# Patient Record
Sex: FEMALE | Race: BLACK OR AFRICAN AMERICAN | URBAN - METROPOLITAN AREA
[De-identification: names, ages, dates, MRNs, and addresses within clinical notes are randomized per-mention and may not be internally consistent; named-entity substitution may affect disease eponyms.]

---

## 2019-12-23 ENCOUNTER — OFFICE VISIT (OUTPATIENT)
Dept: FAMILY MEDICINE CLINIC | Age: 24
End: 2019-12-23

## 2019-12-23 VITALS
TEMPERATURE: 98 F | HEIGHT: 67 IN | HEART RATE: 66 BPM | OXYGEN SATURATION: 100 % | DIASTOLIC BLOOD PRESSURE: 85 MMHG | BODY MASS INDEX: 25.15 KG/M2 | RESPIRATION RATE: 16 BRPM | WEIGHT: 160.2 LBS | SYSTOLIC BLOOD PRESSURE: 130 MMHG

## 2019-12-23 DIAGNOSIS — L67.8 ABNORMAL FACIAL HAIR: ICD-10-CM

## 2019-12-23 DIAGNOSIS — E66.3 OVERWEIGHT (BMI 25.0-29.9): ICD-10-CM

## 2019-12-23 DIAGNOSIS — Z87.2 HISTORY OF ECZEMA: ICD-10-CM

## 2019-12-23 DIAGNOSIS — Z00.00 WELL WOMAN EXAM (NO GYNECOLOGICAL EXAM): Primary | ICD-10-CM

## 2019-12-23 DIAGNOSIS — M54.50 BILATERAL LOW BACK PAIN WITHOUT SCIATICA, UNSPECIFIED CHRONICITY: ICD-10-CM

## 2019-12-23 DIAGNOSIS — Z76.89 ENCOUNTER TO ESTABLISH CARE: ICD-10-CM

## 2019-12-23 RX ORDER — NORGESTIMATE AND ETHINYL ESTRADIOL 7DAYSX3 LO
KIT ORAL
COMMUNITY
Start: 2019-10-18

## 2019-12-23 NOTE — PROGRESS NOTES
[facility] Note      Subjective:     Chief Complaint   Patient presents with    New Patient     establish care    Back Pain     x years    Complete Physical     annual      Pamella Parker is a 27y.o. year old female who presents for evaluation of the following:    Establishment of Care:  Previous PCP: Dr. Joss Maurice, pediatrician  [facility]  Dentist- [facility]on Lyman- Dr. Benjamín Marti- Dr. 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 N   Are you in a relationship with someone who physically or mentally threatens you? N   Is it safe for you to go home? Y       Depression Screen:   3 most recent PHQ Screens 12/23/2019   Little interest or pleasure in doing things Not at all   Feeling down, depressed, irritable, or hopeless Not at all   Total Score PHQ 2 0       Smoker? Never. G0  Pap:  Last   Mammogram: Not due  On OCP   Patient's last menstrual period was 12/21/2019 (exact date). Menses Monthly, lasting 7 days.  No recent worsening bleeding or intermenstrual bleeding reports being sexually active. She reports using the following method of birth control/protection: Pill. Review of Systems   Pertinent positives and negative per HPI. All other systems  reviewed are negative for a Comprehensive ROS (10+). History reviewed. No pertinent past medical history.      Social History     Socioeconomic History    Marital status: SINGLE     Spouse name: Not on file    Number of children: Not on file    Years of education: Not on file    Highest education level: Not on file   Occupational History    Not on file   Social Needs    Financial resource strain: Not on file    Food insecurity:     Worry: Not on file     Inability: Not on file    Transportation needs:     Medical: Not on file     Non-medical: Not on file   Tobacco Use    Smoking status: Never Smoker    Smokeless tobacco: Never Used   Substance and Sexual Activity    Alcohol use: Yes     Comment: occ    Drug use: Never    Sexual activity: Yes     Birth control/protection: Pill   Lifestyle    Physical activity:     Days per week: Not on file     Minutes per session: Not on file    Stress: Not on file   Relationships    Social connections:     Talks on phone: Not on file     Gets together: Not on file     Attends Judaism service: Not on file     Active member of club or organization: Not on file     Attends meetings of clubs or organizations: Not on file     Relationship status: Not on file    Intimate partner violence:     Fear of current or ex partner: Not on file     Emotionally abused: Not on file     Physically abused: Not on file     Forced sexual activity: Not on file   Other Topics Concern    Not on file   Social History Narrative    Not on file       Family History   Problem Relation Age of Onset    No Known Problems Mother     No Known Problems Father     Cancer Paternal Grandmother         ovarian       Current Outpatient Medications   Medication Sig    TRI-LO-JUAN 0.18/0.215/0.25 mg-25 mcg tab TAKE 1 TABLET BY MOUTH EVERY DAY     No current facility-administered medications for this visit. Objective:     Vitals:    12/23/19 1334   BP: 130/85   Pulse: 66   Resp: 16   Temp: 98 °F (36.7 °C)   TempSrc: Oral   SpO2: 100%   Weight: 160 lb 3.2 oz (72.7 kg)   Height: 5' 6.5\" (1.689 m)       Physical Examination:  General: Alert, cooperative, no distress, appears stated age. Overweight   Eyes: Conjunctivae clear. PERRL, EOMs intact. Ears: Normal external ear canals both ears. TM clear and mobile bilaterally  Nose: Nares normal. Septum midline. Mucosa normal. No drainage or sinus tenderness. Mouth/Throat: Lips, mucosa, and tongue normal. No oropharyngeal erythema. No tonsillar enlargement or exudate. Neck: Supple, symmetrical, trachea midline, no adenopathy. No thyroid enlargement/tenderness/nodules  Respiratory: Breathing comfortably, in no acute respiratory distress. Clear to auscultation bilaterally. Normal inspiratory and expiratory ratio. Cardiovascular: Regular rate and rhythm, S1, S2 normal, no murmur, click, rub or gallop.   -Extremities no edema. Pulses 2+ and symmetric radial and DP   Abdomen: Soft, non-tender, not distended. Bowel sounds normal. No masses or organomegaly. MSK: Extremities normal, atraumatic, no effusion. Gait steady and unassisted. Back symmetric, no curvature. ROM normal. No CVA tenderness. Skin: Skin color, texture, turgor normal. No rashes or lesions on exposed skin. Lymph nodes: Cervical, supraclavicular nodes normal.  Neurologic: CNII-XII intact. Strength 5/5 grossly. Sensation and reflexes normal throughout. Psychiatric: Affect appropriate. Mood euthymic. Thoughts logical. Speech volume and speed normal      No visits with results within 3 Month(s) from this visit. Latest known visit with results is:   No results found for any previous visit. Assessment/ Plan:   Diagnoses and all orders for this visit:    1.  Well woman exam (no gynecological exam)  -     CBC WITH AUTOMATED DIFF  -     METABOLIC PANEL, COMPREHENSIVE    2. Encounter to establish care    3. Bilateral low back pain without sciatica, unspecified chronicity    4. History of eczema    5. Abnormal facial hair    6. Overweight (BMI 25.0-29. 9)      PMH reviewed per orders. Meds refilled for chronic conditions per orders. Previous records requested/ reviewed. Labs to [facility] an function and etiology of chronic/acute concerns. Relevant vaccine, cancer screening and other health maintenance reviewed and updated per orders. Diet and lifestyle modification encouraged for weight loss/ maintenance. Exercises and NSAID for MSK concern- mild intermittnet low back pain, likely muscle strain. Follow up with specialists per routine- derm and GYN. Negative depression screen. Educated patient on red flag symptoms to warrant return to clinic or emergency room visit. I have discussed the diagnosis with the patient and the intended plan as seen in the above orders. The patient has been offered or received an after-visit summary and questions were answered concerning future plans. I have discussed medication side effects and warnings with the patient as well.     Follow-up and Dispositions    · Return in about 1 year (around 12/23/2020) for Physical exam.         Signed,    Savannah Kaur MD  12/23/2019

## 2019-12-23 NOTE — PATIENT INSTRUCTIONS
Well Visit, Ages 25 to 48: Care Instructions Your Care Instructions Physical exams can help you stay healthy. Your doctor has checked your overall health and may have suggested ways to take good care of yourself. He or she also may have recommended tests. At home, you can help prevent illness with healthy eating, regular exercise, and other steps. Follow-up care is a key part of your treatment and safety. Be sure to make and go to all appointments, and call your doctor if you are having problems. It's also a good idea to know your test results and keep a list of the medicines you take. How can you care for yourself at home? · Reach and stay at a healthy weight. This will lower your risk for many problems, such as obesity, diabetes, heart disease, and high blood pressure. · Get at least 30 minutes of physical activity on most days of the week. Walking is a good choice. You also may want to do other activities, such as running, swimming, cycling, or playing tennis or team sports. Discuss any changes in your exercise program with your doctor. · Do not smoke or allow others to smoke around you. If you need help quitting, talk to your doctor about stop-smoking programs and medicines. These can increase your chances of quitting for good. · Talk to your doctor about whether you have any risk factors for sexually transmitted infections (STIs). Having one sex partner (who does not have STIs and does not have sex with anyone else) is a good way to avoid these infections. · Use birth control if you do not want to have children at this time. Talk with your doctor about the choices available and what might be best for you. · Protect your skin from too much sun. When you're outdoors from 10 a.m. to 4 p.m., stay in the shade or cover up with clothing and a hat with a wide brim. Wear sunglasses that block UV rays. Even when it's cloudy, put broad-spectrum sunscreen (SPF 30 or higher) on any exposed skin. · See a dentist one or two times a year for checkups and to have your teeth cleaned. · Wear a seat belt in the car. Follow your doctor's advice about when to have certain tests. These tests can spot problems early. For everyone · Cholesterol. Have the fat (cholesterol) in your blood tested after age 21. Your doctor will tell you how often to have this done based on your age, family history, or other things that can increase your risk for heart disease. · Blood pressure. Have your blood pressure checked during a routine doctor visit. Your doctor will tell you how often to check your blood pressure based on your age, your blood pressure results, and other factors. · Vision. Talk with your doctor about how often to have a glaucoma test. 
· Diabetes. Ask your doctor whether you should have tests for diabetes. · Colon cancer. Your risk for colorectal cancer gets higher as you get older. Some experts say that adults should start regular screening at age 48 and stop at age 76. Others say to start before age 48 or continue after age 76. Talk with your doctor about your risk and when to start and stop screening. For women · Breast exam and mammogram. Talk to your doctor about when you should have a clinical breast exam and a mammogram. Medical experts differ on whether and how often women under 50 should have these tests. Your doctor can help you decide what is right for you. · Cervical cancer screening test and pelvic exam. Begin with a Pap test at age 24. The test often is part of a pelvic exam. Starting at age 27, you may choose to have a Pap test, an HPV test, or both tests at the same time (called co-testing). Talk with your doctor about how often to have testing. · Tests for sexually transmitted infections (STIs). Ask whether you should have tests for STIs. You may be at risk if you have sex with more than one person, especially if your partners do not wear condoms. For men · Tests for sexually transmitted infections (STIs). Ask whether you should have tests for STIs. You may be at risk if you have sex with more than one person, especially if you do not wear a condom. · Testicular cancer exam. Ask your doctor whether you should check your testicles regularly. · Prostate exam. Talk to your doctor about whether you should have a blood test (called a PSA test) for prostate cancer. Experts differ on whether and when men should have this test. Some experts suggest it if you are older than 39 and are -American or have a father or brother who got prostate cancer when he was younger than 72. When should you call for help? Watch closely for changes in your health, and be sure to contact your doctor if you have any problems or symptoms that concern you. Where can you learn more? Go to http://zakiya-joo.info/. Enter P072 in the search box to learn more about \"Well Visit, Ages 25 to 48: Care Instructions. \" Current as of: December 13, 2018 Content Version: 12.2 © 4061-4212 [employer]. Care instructions adapted under license by [employer] (which disclaims liability or warranty for this information). If you have questions about a medical condition or this instruction, always ask your healthcare professional. [place] [address] any warranty or liability for your use of this information. Low Back Pain: Exercises Introduction Here are some examples of exercises for you to try. The exercises may be suggested for a condition or for rehabilitation. Start each exercise slowly. Ease off the exercises if you start to have pain. You will be told when to start these exercises and which ones will work best for you. How to do the exercises Press-up 1. Lie on your stomach, supporting your body with your forearms. 2. Press your elbows down into the floor to raise your upper back.  As you do this, relax your stomach muscles and allow your back to arch without using your back muscles. As your press up, do not let your hips or pelvis come off the floor. 3. Hold for 15 to 30 seconds, then relax. 4. Repeat 2 to 4 times. Alternate arm and leg (bird dog) exercise 1. Start on the floor, on your hands and knees. 2. Tighten your belly muscles. 3. Raise one leg off the floor, and hold it straight out behind you. Be careful not to let your hip drop down, because that will twist your trunk. 4. Hold for about 6 seconds, then lower your leg and switch to the other leg. 5. Repeat 8 to 12 times on each leg. 6. Over time, work up to holding for 10 to 30 seconds each time. 7. If you feel stable and secure with your leg raised, try raising the opposite arm straight out in front of you at the same time. Knee-to-chest exercise 1. Lie on your back with your knees bent and your feet flat on the floor. 2. Bring one knee to your chest, keeping the other foot flat on the floor (or keeping the other leg straight, whichever feels better on your lower back). 3. Keep your lower back pressed to the floor. Hold for at least 15 to 30 seconds. 4. Relax, and lower the knee to the starting position. 5. Repeat with the other leg. Repeat 2 to 4 times with each leg. 6. To get more stretch, put your other leg flat on the floor while pulling your knee to your chest. 
 
Curl-ups 1. Lie on the floor on your back with your knees bent at a 90-degree angle. Your feet should be flat on the floor, about 12 inches from your buttocks. 2. Cross your arms over your chest. If this bothers your neck, try putting your hands behind your neck (not your head), with your elbows spread apart. 3. Slowly tighten your belly muscles and raise your shoulder blades off the floor.  
4. Keep your head in line with your body, and do not press your chin to your chest. 
 5. Hold this position for 1 or 2 seconds, then slowly lower yourself back down to the floor. 6. Repeat 8 to 12 times. Pelvic tilt exercise 1. Lie on your back with your knees bent. 2. \"Brace\" your stomach. This means to tighten your muscles by pulling in and imagining your belly button moving toward your spine. You should feel like your back is pressing to the floor and your hips and pelvis are rocking back. 3. Hold for about 6 seconds while you breathe smoothly. 4. Repeat 8 to 12 times. Heel dig bridging 1. Lie on your back with both knees bent and your ankles bent so that only your heels are digging into the floor. Your knees should be bent about 90 degrees. 2. Then push your heels into the floor, squeeze your buttocks, and lift your hips off the floor until your shoulders, hips, and knees are all in a straight line. 3. Hold for about 6 seconds as you continue to breathe normally, and then slowly lower your hips back down to the floor and rest for up to 10 seconds. 4. Do 8 to 12 repetitions. Hamstring stretch in doorway 1. Lie on your back in a doorway, with one leg through the open door. 2. Slide your leg up the wall to straighten your knee. You should feel a gentle stretch down the back of your leg. 3. Hold the stretch for at least 15 to 30 seconds. Do not arch your back, point your toes, or bend either knee. Keep one heel touching the floor and the other heel touching the wall. 4. Repeat with your other leg. 5. Do 2 to 4 times for each leg. Hip flexor stretch 1. Kneel on the floor with one knee bent and one leg behind you. Place your forward knee over your foot. Keep your other knee touching the floor. 2. Slowly push your hips forward until you feel a stretch in the upper thigh of your rear leg. 3. Hold the stretch for at least 15 to 30 seconds. Repeat with your other leg. 4. Do 2 to 4 times on each side. Wall sit 1. Stand with your back 10 to 12 inches away from a wall. 2. Lean into the wall until your back is flat against it. 3. Slowly slide down until your knees are slightly bent, pressing your lower back into the wall. 4. Hold for about 6 seconds, then slide back up the wall. 5. Repeat 8 to 12 times. Follow-up care is a key part of your treatment and safety. Be sure to make and go to all appointments, and call your doctor if you are having problems. It's also a good idea to know your test results and keep a list of the medicines you take. Where can you learn more? Go to http://zakiya-joo.info/. Enter R944 in the search box to learn more about \"Low Back Pain: Exercises. \" Current as of: June 26, 2019 Content Version: 12.2 © 1482-6903 [employer]. Care instructions adapted under license by [employer] (which disclaims liability or warranty for this information). If you have questions about a medical condition or this instruction, always ask your healthcare professional. [address] any warranty or liability for your use of this information.

## 2019-12-24 LAB
ALBUMIN SERPL-MCNC: 4.2 G/DL (ref 3.5–5.5)
ALBUMIN/GLOB SERPL: 1.6 {RATIO} (ref 1.2–2.2)
ALP SERPL-CCNC: 47 IU/L (ref 39–117)
ALT SERPL-CCNC: 23 IU/L (ref 0–32)
AST SERPL-CCNC: 23 IU/L (ref 0–40)
BASOPHILS # BLD AUTO: 0 X10E3/UL (ref 0–0.2)
BASOPHILS NFR BLD AUTO: 1 %
BILIRUB SERPL-MCNC: 1 MG/DL (ref 0–1.2)
BUN SERPL-MCNC: 10 MG/DL (ref 6–20)
BUN/CREAT SERPL: 11 (ref 9–23)
CALCIUM SERPL-MCNC: 9.6 MG/DL (ref 8.7–10.2)
CHLORIDE SERPL-SCNC: 107 MMOL/L (ref 96–106)
CO2 SERPL-SCNC: 21 MMOL/L (ref 20–29)
CREAT SERPL-MCNC: 0.94 MG/DL (ref 0.57–1)
EOSINOPHIL # BLD AUTO: 0.2 X10E3/UL (ref 0–0.4)
EOSINOPHIL NFR BLD AUTO: 3 %
ERYTHROCYTE [DISTWIDTH] IN BLOOD BY AUTOMATED COUNT: 12.1 % (ref 12.3–15.4)
GLOBULIN SER CALC-MCNC: 2.7 G/DL (ref 1.5–4.5)
GLUCOSE SERPL-MCNC: 74 MG/DL (ref 65–99)
HCT VFR BLD AUTO: 39.9 % (ref 34–46.6)
HGB BLD-MCNC: 13.2 G/DL (ref 11.1–15.9)
IMM GRANULOCYTES # BLD AUTO: 0 X10E3/UL (ref 0–0.1)
IMM GRANULOCYTES NFR BLD AUTO: 0 %
LYMPHOCYTES # BLD AUTO: 1.6 X10E3/UL (ref 0.7–3.1)
LYMPHOCYTES NFR BLD AUTO: 24 %
MCH RBC QN AUTO: 30.2 PG (ref 26.6–33)
MCHC RBC AUTO-ENTMCNC: 33.1 G/DL (ref 31.5–35.7)
MCV RBC AUTO: 91 FL (ref 79–97)
MONOCYTES # BLD AUTO: 0.8 X10E3/UL (ref 0.1–0.9)
MONOCYTES NFR BLD AUTO: 11 %
NEUTROPHILS # BLD AUTO: 4.2 X10E3/UL (ref 1.4–7)
NEUTROPHILS NFR BLD AUTO: 61 %
PLATELET # BLD AUTO: 458 X10E3/UL (ref 150–450)
POTASSIUM SERPL-SCNC: 4.7 MMOL/L (ref 3.5–5.2)
PROT SERPL-MCNC: 6.9 G/DL (ref 6–8.5)
RBC # BLD AUTO: 4.37 X10E6/UL (ref 3.77–5.28)
SODIUM SERPL-SCNC: 135 MMOL/L (ref 134–144)
WBC # BLD AUTO: 6.8 X10E3/UL (ref 3.4–10.8)

## 2019-12-27 NOTE — PROGRESS NOTES
Call placed to patient. Name and  verified. Reviewed recent labs with patient.  She verbalized understanding

## 2020-07-21 ENCOUNTER — VIRTUAL VISIT (OUTPATIENT)
Dept: FAMILY MEDICINE CLINIC | Age: 25
End: 2020-07-21

## 2020-07-21 DIAGNOSIS — R45.0 NERVOUSNESS: ICD-10-CM

## 2020-07-21 DIAGNOSIS — R00.2 POUNDING HEARTBEAT: ICD-10-CM

## 2020-07-21 DIAGNOSIS — F41.9 ANXIETY: Primary | ICD-10-CM

## 2020-07-21 DIAGNOSIS — F43.9 STRESS: ICD-10-CM

## 2020-07-21 RX ORDER — BISMUTH SUBSALICYLATE 262 MG
2 TABLET,CHEWABLE ORAL DAILY
COMMUNITY

## 2020-07-21 NOTE — PROGRESS NOTES
VIRTUAL VISIT    Patient seen via virtual visit today for the following:  Chief Complaint   Patient presents with    Palpitations    Anxiety    Stress    Labs     HISTORY OF PRESENT ILLNESS   HPI  2-3 month h/o increased feelings of anxiety, worry, nervousness, \"weird sensation through her whole body\" that she states is \"hard to explain\". She admits to increased stress and anxiety related to current Covid pandemic as well as the stress of being in dental school. She has also felt a sense of isolation and decreased social connections since having to be socially distanced due to Covid. This has made her feel closed in and detached. Mind racing more. Worrying about things more. Sleep patterns more restless some nights. Appetite and weight have been stable. Denies feelings of sadness or depression. Denies prior h/o anxiety, depression or treatment for mental health. Yesterday she had a few minutes of feeling like her heart was pounding hard but not fast or irregular. This was not associated w/ any chest pain, sob, light headedness, dizziness, diaphoresis, nausea or vomiting. Her parents are driving her back to [place] this weekend to head back to school and just wanted to be sure that her health is ok and that what she is feeling is truly anxiety as she suspects. She is specifically wanting to get \"some general blood work\" to make sure she is ok. She is overall in good health and there have been no major health changes. 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 Has regular menstrual cycles on her BCP, normal flow. No gyn complaints. Family h/o anxiety in both maternal grandparents. Unaware of any family h/o heart arrhythmias or other cardiac conditions. REVIEW OF SYMPTOMS   Review of Systems   Constitutional: Negative. Negative for chills, fever, malaise/fatigue and weight loss. Respiratory: Negative. Negative for cough and shortness of breath. Cardiovascular: Negative for chest pain. Gastrointestinal: Negative. Negative for nausea and vomiting. Genitourinary: Negative. Neurological: Negative. Negative for loss of consciousness. Endo/Heme/Allergies: Negative. Psychiatric/Behavioral: Negative for depression. The patient is nervous/anxious. PROBLEM LIST/MEDICAL HISTORY     Problem List  Date Reviewed: 12/23/2019    None              PAST SURGICAL HISTORY   No past surgical history on file. MEDICATIONS     Current Outpatient Medications   Medication Sig    multivitamin (ONE A DAY) tablet Take 2 Tabs by mouth daily.  TRI-LO-JUAN 0.18/0.215/0.25 mg-25 mcg tab TAKE 1 TABLET BY MOUTH EVERY DAY     No current facility-administered medications for this visit.            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[employer], graduates 2022   Tobacco Use    Smoking status: Never Smoker    Smokeless tobacco: Never Used   Substance and Sexual Activity    Alcohol use: Yes     Comment: very occasional    Drug use: Never    Sexual activity: Yes     Birth control/protection: Pill   Other Topics Concern    Caffeine Concern No     Comment: none    Special Diet No    Exercise Yes     Comment: usually 3 x a week yoga, abs, walks        IMMUNIZATIONS  Immunization History   Administered Date(s) Administered    Influenza Vaccine 09/20/2019         FAMILY HISTORY     Family History   Problem Relation Age of Onset    No Known Problems Mother     No Known Problems Father     Cancer Paternal Grandmother         ovarian    No Known Problems Sister     Anxiety Maternal Grandmother     Anxiety Maternal Grandfather    Denies know family history of heart arrhythmias or other cardiac conditions      VITALS   Vitals limited due to virtual visit. Self reported data collected today:  Patient-Reported Vitals 7/21/2020   Patient-Reported LMP 7-        PHYSICAL EXAMINATION   Physical Exam  Constitutional:       General: She is not in acute distress. Appearance: Normal appearance. She is not ill-appearing. Pulmonary:      Effort: Pulmonary effort is normal. No respiratory distress. Neurological:      Mental Status: She is oriented to person, place, and time. Psychiatric:         Attention and Perception: Attention normal.         Mood and Affect: Mood normal. Mood is not anxious or depressed. Speech: Speech normal.         Behavior: Behavior normal.         Thought Content: Thought content normal.         Judgment: Judgment normal.               ASSESSMENT & PLAN       ICD-10-CM ICD-9-CM    1. Anxiety  F41.9 300.00 REFERRAL TO PSYCHOLOGY   2. Stress  F43.9 V62.89 REFERRAL TO PSYCHOLOGY   3. Nervousness  R45.0 799.21 CBC W/O DIFF      METABOLIC PANEL, COMPREHENSIVE      TSH 3RD GENERATION      T4, FREE   4. Pounding heartbeat  R00.2 785.1 CBC W/O DIFF      METABOLIC PANEL, COMPREHENSIVE      TSH 3RD GENERATION      T4, FREE     Usual patient of Dr. Bruno Robb presents via virtual visit today to discuss recent feelings of anxiety, stress and nervousness. She is in dental school and her parents are driving her back to [place] this weekend to head back to school. So she just wanted to be sure that her health is ok and that what she is feeling is truly anxiety as she suspects. She is specifically wanting to get \"some general blood work\" to make sure she is ok. She is overall in good health and there have been no major health changes. She would also be interested in counseling and I have given her a list of local groups/names so that she could perhaps arrange a virtual visit since she is heading back to school.   I also recommended she could check w/ resources through student health at her dental program or get local recommendations there as well. Further follow up & other recommendations pending review of labs as well.   ---------------------------------------------------------------------------------------------------------------  Patient is being evaluated by a video visit encounter for concerns as above. A caregiver was present when appropriate. Due to this being a TeleHealth encounter (During [facility] public [facility] emergency), evaluation of the following organ systems was limited: Vitals/Constitutional/EENT/Resp/CV/GI//MS/Neuro/Skin/Heme-Lymph-Imm. Pursuant to the emergency declaration under the [address] [place] [address], [facility] waiver authority and the [employer] Act, this Virtual  Visit was conducted, with patient's (and/or legal guardian's) consent, to reduce the patient's risk of exposure to COVID-19 and provide necessary medical care. Services were provided through a video synchronous discussion virtually to substitute for in-person clinic visit. Patient was located at their own home. I was at home while conducting this encounter. Consent:  She and/or her healthcare decision maker is aware that this patient-initiated Telehealth encounter is a billable service, with coverage as determined by her insurance carrier. She is aware that she may receive a bill and has provided verbal consent to proceed: Yes  This virtual visit was conducted via [employer] me. Pursuant to the emergency declaration under the [facility] waiver authority and the [employer] Act, this Virtual  Visit was conducted to reduce the patient's risk of exposure to COVID-19 and provide continuity of care for an established patient.   Services were provided through a video synchronous discussion virtually to substitute for in-person clinic visit. Due to this being a TeleHealth evaluation, many elements of the physical examination are unable to be assessed. Total Time: minutes: 21-30 minutes.

## 2020-07-23 LAB
ALBUMIN SERPL-MCNC: 4.1 G/DL (ref 3.9–5)
ALBUMIN/GLOB SERPL: 1.6 {RATIO} (ref 1.2–2.2)
ALP SERPL-CCNC: 48 IU/L (ref 39–117)
ALT SERPL-CCNC: 31 IU/L (ref 0–32)
AST SERPL-CCNC: 26 IU/L (ref 0–40)
BILIRUB SERPL-MCNC: 0.9 MG/DL (ref 0–1.2)
BUN SERPL-MCNC: 11 MG/DL (ref 6–20)
BUN/CREAT SERPL: 12 (ref 9–23)
CALCIUM SERPL-MCNC: 9.7 MG/DL (ref 8.7–10.2)
CHLORIDE SERPL-SCNC: 107 MMOL/L (ref 96–106)
CO2 SERPL-SCNC: 22 MMOL/L (ref 20–29)
CREAT SERPL-MCNC: 0.94 MG/DL (ref 0.57–1)
ERYTHROCYTE [DISTWIDTH] IN BLOOD BY AUTOMATED COUNT: 11.9 % (ref 11.7–15.4)
GLOBULIN SER CALC-MCNC: 2.6 G/DL (ref 1.5–4.5)
GLUCOSE SERPL-MCNC: 85 MG/DL (ref 65–99)
HCT VFR BLD AUTO: 38.8 % (ref 34–46.6)
HGB BLD-MCNC: 12.6 G/DL (ref 11.1–15.9)
MCH RBC QN AUTO: 30.4 PG (ref 26.6–33)
MCHC RBC AUTO-ENTMCNC: 32.5 G/DL (ref 31.5–35.7)
MCV RBC AUTO: 94 FL (ref 79–97)
PLATELET # BLD AUTO: 416 X10E3/UL (ref 150–450)
POTASSIUM SERPL-SCNC: 4.6 MMOL/L (ref 3.5–5.2)
PROT SERPL-MCNC: 6.7 G/DL (ref 6–8.5)
RBC # BLD AUTO: 4.14 X10E6/UL (ref 3.77–5.28)
SODIUM SERPL-SCNC: 141 MMOL/L (ref 134–144)
T4 FREE SERPL-MCNC: 1.16 NG/DL (ref 0.82–1.77)
TSH SERPL DL<=0.005 MIU/L-ACNC: 1.63 UIU/ML (ref 0.45–4.5)
WBC # BLD AUTO: 5.9 X10E3/UL (ref 3.4–10.8)

## 2020-08-02 NOTE — PROGRESS NOTES
Advise pt her blood counts, liver, kidney, electrolytes, thyroid are normal and her glucose/blood sugar was good and normal non diabetes range. Continue w/ recs as discussed at her appt and arrange follow up if things arent improving, sooner if anything worsens in the interim. ER for any acute changes/concerns.

## 2020-08-04 NOTE — PROGRESS NOTES
Outbound call to patient. Patient identified with two identifiers. Patient made aware of lab results and recommendations per Dr. Simona Magana. Patient verbalized understanding.

## 2020-12-29 ENCOUNTER — OFFICE VISIT (OUTPATIENT)
Dept: FAMILY MEDICINE CLINIC | Age: 25
End: 2020-12-29
Payer: COMMERCIAL

## 2020-12-29 VITALS
HEIGHT: 66 IN | TEMPERATURE: 97.9 F | WEIGHT: 155 LBS | BODY MASS INDEX: 24.91 KG/M2 | HEART RATE: 86 BPM | SYSTOLIC BLOOD PRESSURE: 112 MMHG | DIASTOLIC BLOOD PRESSURE: 82 MMHG | OXYGEN SATURATION: 99 % | RESPIRATION RATE: 18 BRPM

## 2020-12-29 DIAGNOSIS — Z00.00 ROUTINE GENERAL MEDICAL EXAMINATION AT A HEALTH CARE FACILITY: Primary | ICD-10-CM

## 2020-12-29 PROCEDURE — 99395 PREV VISIT EST AGE 18-39: CPT | Performed by: FAMILY MEDICINE

## 2020-12-29 RX ORDER — DESOXIMETASONE 2.5 MG/G
OINTMENT TOPICAL AS NEEDED
COMMUNITY
Start: 2020-12-17

## 2020-12-29 NOTE — PROGRESS NOTES
Chief Complaint   Patient presents with    Complete Physical       HISTORY OF PRESENT ILLNESS   Complete Physical  The history is provided by the patient. Follows sensible balanced diet  Exercises 3-4 x a week  Weight stable  Overall has been getting along well and feeling well in general  Sees gyn annually for well woman visits/gyn exams  Pap reportedly negative earlier this summer     REVIEW OF SYMPTOMS   Review of Systems   Constitutional: Negative. HENT: Negative. Eyes: Negative. Respiratory: Negative. Cardiovascular: Negative. Gastrointestinal: Negative. Genitourinary: Negative. Musculoskeletal: Negative. Neurological: Negative. Endo/Heme/Allergies: Negative. Psychiatric/Behavioral: Negative. Negative for depression. The patient is not nervous/anxious. She went through some stress earlier this summer and talked w/ a counselor on her school campus a few times through virtual visits, that was really helpful and she is doing much better now, focusing more on self care and time/stress mgt           PROBLEM LIST/MEDICAL HISTORY     Problem List  Date Reviewed: 12/29/2020    None              PAST SURGICAL HISTORY   History reviewed. No pertinent surgical history. MEDICATIONS     Current Outpatient Medications   Medication Sig    desoximetasone (TOPICORT) 0.25 % ointment as needed.  multivitamin (ONE A DAY) tablet Take 2 Tabs by mouth daily.  TRI-LO-JUAN 0.18/0.215/0.25 mg-25 mcg tab TAKE 1 TABLET BY MOUTH EVERY DAY     No current facility-administered medications for this visit.            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[employer], graduates 2022   Tobacco Use    Smoking status: Never Smoker    Smokeless tobacco: Never Used   Substance and Sexual Activity    Alcohol use: Yes     Frequency: Monthly or less     Drinks per session: 1 or 2     Binge frequency: Less than monthly     Comment: very occasional    Drug use: Never    Sexual activity: Yes     Partners: Male     Birth control/protection: Pill, Condom   Other Topics Concern    Caffeine Concern No     Comment: none    Special Diet No    Exercise Yes     Comment: usually 3 x a week yoga, abs, walks   Social History Narrative    2nd year of Dental School at [email or web address] 2022    Balanced diet    Occasional tea, no coffee or sodas    Exercises 3-4 x a week: cardio, dance, yoga        IMMUNIZATIONS  Immunization History   Administered Date(s) Administered    Influenza Vaccine 09/20/2019    Influenza Vaccine (Quadrivalent)(>18 Yrs [employer] 30140) 10/15/2020    TB Skin Test (PPD) Intradermal 05/28/2019    Td, Adsorbed PF 05/28/2019         FAMILY HISTORY     Family History   Problem Relation Age of Onset    No Known Problems Mother     No Known Problems Father     Ovarian Cancer Paternal Grandmother         diagnosed in her 66's   [de-identified] No Known Problems Sister     Anxiety Maternal Grandmother     Anxiety Maternal Grandfather     Arrhythmia Neg Hx     Heart Attack Neg Hx     Heart defect Neg Hx          VITALS     Visit Vitals  /82 (BP 1 Location: Right arm, BP Patient Position: Sitting)   Pulse 86   Temp 97.9 °F (36.6 °C) (Temporal)   Resp 18   Ht 5' 6\" (1.676 m)   Wt 155 lb (70.3 kg)   LMP 12/22/2020   SpO2 99%   BMI 25.02 kg/m²          PHYSICAL EXAMINATION   Physical Exam  Vitals signs reviewed. Constitutional:       General: She is not in acute distress. Appearance: Normal appearance. HENT:      Right Ear: Tympanic membrane normal.      Left Ear: Tympanic membrane normal.   Eyes:      Conjunctiva/sclera: Conjunctivae normal.   Neck:      Musculoskeletal: Neck supple.       Thyroid: No thyroid mass, thyromegaly or thyroid tenderness. Cardiovascular:      Rate and Rhythm: Normal rate and regular rhythm. Heart sounds: Normal heart sounds. No murmur. Pulmonary:      Effort: Pulmonary effort is normal.      Breath sounds: Normal breath sounds. Abdominal:      General: Abdomen is flat. There is no distension. Palpations: Abdomen is soft. Tenderness: There is no abdominal tenderness. Musculoskeletal: Normal range of motion. General: No swelling. Lymphadenopathy:      Cervical: No cervical adenopathy. Skin:     General: Skin is warm and dry. Neurological:      General: No focal deficit present. Mental Status: She is alert and oriented to person, place, and time. Mental status is at baseline. Psychiatric:         Mood and Affect: Mood normal.         Behavior: Behavior normal.             DIAGNOSTIC DATA         LABORATORY DATA     Results for orders placed or performed in visit on 07/21/20   CBC W/O DIFF   Result Value Ref Range    WBC 5.9 3.4 - 10.8 x10E3/uL    RBC 4.14 3.77 - 5.28 x10E6/uL    HGB 12.6 11.1 - 15.9 g/dL    HCT 38.8 34.0 - 46.6 %    MCV 94 79 - 97 fL    MCH 30.4 26.6 - 33.0 pg    MCHC 32.5 31.5 - 35.7 g/dL    RDW 11.9 11.7 - 15.4 %    PLATELET 989 490 - 897 Q55Y3/NQ   METABOLIC PANEL, COMPREHENSIVE   Result Value Ref Range    Glucose 85 65 - 99 mg/dL    BUN 11 6 - 20 mg/dL    Creatinine 0.94 0.57 - 1.00 mg/dL    GFR est non-AA 86 >59 mL/min/1.73    GFR est  >59 mL/min/1.73    BUN/Creatinine ratio 12 9 - 23    Sodium 141 134 - 144 mmol/L    Potassium 4.6 3.5 - 5.2 mmol/L    Chloride 107 (H) 96 - 106 mmol/L    CO2 22 20 - 29 mmol/L    Calcium 9.7 8.7 - 10.2 mg/dL    Protein, total 6.7 6.0 - 8.5 g/dL    Albumin 4.1 3.9 - 5.0 g/dL    GLOBULIN, TOTAL 2.6 1.5 - 4.5 g/dL    A-G Ratio 1.6 1.2 - 2.2    Bilirubin, total 0.9 0.0 - 1.2 mg/dL    Alk.  phosphatase 48 39 - 117 IU/L    AST (SGOT) 26 0 - 40 IU/L    ALT (SGPT) 31 0 - 32 IU/L   TSH 3RD GENERATION   Result Value Ref Range    TSH 1.630 0.450 - 4.500 uIU/mL   T4, FREE   Result Value Ref Range    T4, Free 1.16 0.82 - 1.77 ng/dL            ASSESSMENT & PLAN       ICD-10-CM ICD-9-CM    1. Routine general medical examination at a health care facility  Z00.00 V70.0      Healthy 24 yo wellness visit  Reviewed diet, nutrition, exercise, weight management, BMI/goals. Age/risk based screening recommendations, health maintenance & prevention counseling. Cancer screening USPTFS guidelines reviewed w/ pt today. Discussed benefits/positive/negative outcomes of screening based on age/risk stratification. Informed consent for/against screening based on pt's personal hx/risk factors. Updated in history above and health maintenance.    Sees gyn annually for well woman visits/gyn exams  Pap and STD screening there reportedly negative earlier this summer  Follow up 1 yr, sooner prn

## 2020-12-29 NOTE — PROGRESS NOTES
Chief Complaint   Patient presents with    Complete Physical       1. Have you been to the ER, urgent care clinic since your last visit? Hospitalized since your last visit? No    2. Have you seen or consulted any other health care providers outside of the [address] [place] [address] since your last visit? Include any pap smears or colon screening. No      3 most recent PHQ Screens 12/23/2019   Little interest or pleasure in doing things Not at all   Feeling down, depressed, irritable, or hopeless Not at all   Total Score PHQ 2 0         Abuse Screening Questionnaire 12/23/2019   Do you ever feel afraid of your partner? N   Are you in a relationship with someone who physically or mentally threatens you? N   Is it safe for you to go home?  Andra Patten

## 2022-02-08 NOTE — PATIENT INSTRUCTIONS
Well Visit, Ages 25 to 48: Care Instructions  Your Care Instructions     Physical exams can help you stay healthy. Your doctor has checked your overall health and may have suggested ways to take good care of yourself. He or she also may have recommended tests. At home, you can help prevent illness with healthy eating, regular exercise, and other steps. Follow-up care is a key part of your treatment and safety. Be sure to make and go to all appointments, and call your doctor if you are having problems. It's also a good idea to know your test results and keep a list of the medicines you take. How can you care for yourself at home? · Reach and stay at a healthy weight. This will lower your risk for many problems, such as obesity, diabetes, heart disease, and high blood pressure. · Get at least 30 minutes of physical activity on most days of the week. Walking is a good choice. You also may want to do other activities, such as running, swimming, cycling, or playing tennis or team sports. Discuss any changes in your exercise program with your doctor. · Do not smoke or allow others to smoke around you. If you need help quitting, talk to your doctor about stop-smoking programs and medicines. These can increase your chances of quitting for good. · Talk to your doctor about whether you have any risk factors for sexually transmitted infections (STIs). Having one sex partner (who does not have STIs and does not have sex with anyone else) is a good way to avoid these infections. · Use birth control if you do not want to have children at this time. Talk with your doctor about the choices available and what might be best for you. · Protect your skin from too much sun. When you're outdoors from 10 a.m. to 4 p.m., stay in the shade or cover up with clothing and a hat with a wide brim. Wear sunglasses that block UV rays. Even when it's cloudy, put broad-spectrum sunscreen (SPF 30 or higher) on any exposed skin.   · See a dentist one or two times a year for checkups and to have your teeth cleaned. · Wear a seat belt in the car. Follow your doctor's advice about when to have certain tests. These tests can spot problems early. For everyone  · Cholesterol. Have the fat (cholesterol) in your blood tested after age 21. Your doctor will tell you how often to have this done based on your age, family history, or other things that can increase your risk for heart disease. · Blood pressure. Have your blood pressure checked during a routine doctor visit. Your doctor will tell you how often to check your blood pressure based on your age, your blood pressure results, and other factors. · Vision. Talk with your doctor about how often to have a glaucoma test.  · Diabetes. Ask your doctor whether you should have tests for diabetes. · Colon cancer. Your risk for colorectal cancer gets higher as you get older. Some experts say that adults should start regular screening at age 48 and stop at age 76. Others say to start before age 48 or continue after age 76. Talk with your doctor about your risk and when to start and stop screening. For women  · Breast exam and mammogram. Talk to your doctor about when you should have a clinical breast exam and a mammogram. Medical experts differ on whether and how often women under 50 should have these tests. Your doctor can help you decide what is right for you. · Cervical cancer screening test and pelvic exam. Begin with a Pap test at age 24. The test often is part of a pelvic exam. Starting at age 27, you may choose to have a Pap test, an HPV test, or both tests at the same time (called co-testing). Talk with your doctor about how often to have testing. · Tests for sexually transmitted infections (STIs). Ask whether you should have tests for STIs. You may be at risk if you have sex with more than one person, especially if your partners do not wear condoms.   For men  · Tests for sexually transmitted infections (STIs). Ask whether you should have tests for STIs. You may be at risk if you have sex with more than one person, especially if you do not wear a condom. · Testicular cancer exam. Ask your doctor whether you should check your testicles regularly. · Prostate exam. Talk to your doctor about whether you should have a blood test (called a PSA test) for prostate cancer. Experts differ on whether and when men should have this test. Some experts suggest it if you are older than 39 and are -American or have a father or brother who got prostate cancer when he was younger than 72. When should you call for help? Watch closely for changes in your health, and be sure to contact your doctor if you have any problems or symptoms that concern you. Where can you learn more? Go to http://www.mendiola.com/  Enter P072 in the search box to learn more about \"Well Visit, Ages 25 to 48: Care Instructions. \"  Current as of: May 27, 2020               Content Version: 12.6  © 2006-2020 [employer]. Care instructions adapted under license by [employer] (which disclaims liability or warranty for this information). If you have questions about a medical condition or this instruction, always ask your healthcare professional. [place] [address] any warranty or liability for your use of this information. Dapsone Pregnancy And Lactation Text: This medication is Pregnancy Category C and is not considered safe during pregnancy or breast feeding.